# Patient Record
Sex: MALE | Race: WHITE | HISPANIC OR LATINO
[De-identification: names, ages, dates, MRNs, and addresses within clinical notes are randomized per-mention and may not be internally consistent; named-entity substitution may affect disease eponyms.]

---

## 2017-10-31 ENCOUNTER — APPOINTMENT (OUTPATIENT)
Dept: NEUROLOGY | Facility: CLINIC | Age: 72
End: 2017-10-31
Payer: MEDICARE

## 2017-10-31 VITALS — HEART RATE: 56 BPM | DIASTOLIC BLOOD PRESSURE: 80 MMHG | SYSTOLIC BLOOD PRESSURE: 126 MMHG | OXYGEN SATURATION: 96 %

## 2017-10-31 PROCEDURE — 99213 OFFICE O/P EST LOW 20 MIN: CPT

## 2018-10-31 ENCOUNTER — APPOINTMENT (OUTPATIENT)
Dept: NEUROLOGY | Facility: CLINIC | Age: 73
End: 2018-10-31

## 2019-01-29 ENCOUNTER — APPOINTMENT (OUTPATIENT)
Dept: NEUROLOGY | Facility: CLINIC | Age: 74
End: 2019-01-29

## 2019-11-20 ENCOUNTER — APPOINTMENT (OUTPATIENT)
Dept: NEUROLOGY | Facility: CLINIC | Age: 74
End: 2019-11-20
Payer: MEDICARE

## 2019-11-20 VITALS
HEIGHT: 69 IN | WEIGHT: 174 LBS | DIASTOLIC BLOOD PRESSURE: 94 MMHG | OXYGEN SATURATION: 93 % | BODY MASS INDEX: 25.77 KG/M2 | SYSTOLIC BLOOD PRESSURE: 144 MMHG | HEART RATE: 84 BPM

## 2019-11-20 DIAGNOSIS — H49.01 THIRD [OCULOMOTOR] NERVE PALSY, RIGHT EYE: ICD-10-CM

## 2019-11-20 PROCEDURE — 99214 OFFICE O/P EST MOD 30 MIN: CPT

## 2019-11-20 RX ORDER — ATORVASTATIN CALCIUM 10 MG/1
10 TABLET, FILM COATED ORAL
Refills: 0 | Status: ACTIVE | COMMUNITY

## 2019-11-20 NOTE — HISTORY OF PRESENT ILLNESS
[FreeTextEntry1] : 74 year-old male presents for follow up for midbrain Stroke.  He had questions regarding transient visual symptoms and their connection to stroke.  He hasn't had any double vision for at least one year.  Even then, it was very transient upon wakening and didn't last more than a few seconds.  It only remained on the day that he was admitted for the stroke.He follows with Behavioral Optometrist with improving vision, more in his left than right eyes.  No weakness or numbness.  No speech deficits.  [facility].\par \par He's been compliant with his Aspirin.\par He was started on Atorvastatin 10mg for reportedly slightly high cholesterol.  He'll be having repeat lab work in a few weeks at his PMD. \par He was told that his blood pressure was a little high\par \par Exercises regularly.

## 2019-11-20 NOTE — ASSESSMENT
[FreeTextEntry1] : 74 year-old male presents for follow up for midbrain Stroke.  He has no acute complaints but had many conceptual questions about his stroke that I answers.  His neurological exam remains intact with full visual eye movements.     \par \par Plan for sequalae post CVA -  \par 1)  Secondary Stroke prevention - \par a) Continue Aspirin 81mg for antiplatelet\par b) Continue Atorvastatin 10mg for statin.  Would appreciate if his PMD faxes over results when he has repeat lipid profile in a few months.  LDL goal is less than 100.\par \par 2) Risk factor management - his stroke was more related to small vessel disease.\par a) Unclear if he has HTN - deferred to his PMD but I think it would be beneficial to measure his blood pressure at home with further discussion depending on the results\par b) Hyperlipidemia - see above.\par \par 3) Continue exercise as part of healthy lifestyle.\par \par Thank you for allowing me to participate in the care of your patient.  If you have any questions, please feel free to call me at [phone].

## 2019-11-20 NOTE — CONSULT LETTER
[Dear  ___] : Dear  [unfilled], [Courtesy Letter:] : I had the pleasure of seeing your patient, [unfilled], in my office today. [FreeTextEntry2] : Darin Clarke MD\par [facility]\par [address] [place] [address], [address]\par [place] [phone]

## 2019-11-20 NOTE — PHYSICAL EXAM
[FreeTextEntry1] : General: sitting on exam table comfortably, NAD\par Eyes: anicteric sclera\par CV RRR\par Extremities: 2+ radial pulses bilaterally, FROMx4\par \par Neurological exam:\par Mental status: AA&O x 3, fluent, no dysarthria, follows commands, able give full history, memory intact, attention intact, fund of knowledge appropriate\par Cranial nerves: EOMI, no ptosis, PERRL+, VFF, no facial droop, palatal elevation intact, SCM/shoulder shrug 5/5 bilaterally, tongue midline\par Motor: No pronator drift, 5/5 x4, normal bulk and tone\par Sensory: Intact light touch bilaterally\par Cerebellar: FNF and HKS intact bilaterally\par Gait: steady

## 2020-10-22 ENCOUNTER — APPOINTMENT (OUTPATIENT)
Dept: NEUROLOGY | Facility: CLINIC | Age: 75
End: 2020-10-22
Payer: MEDICARE

## 2020-10-22 VITALS
OXYGEN SATURATION: 97 % | HEART RATE: 80 BPM | TEMPERATURE: 97.9 F | HEIGHT: 69 IN | WEIGHT: 190 LBS | BODY MASS INDEX: 28.14 KG/M2 | DIASTOLIC BLOOD PRESSURE: 94 MMHG | SYSTOLIC BLOOD PRESSURE: 160 MMHG

## 2020-10-22 DIAGNOSIS — E78.49 OTHER HYPERLIPIDEMIA: ICD-10-CM

## 2020-10-22 DIAGNOSIS — I69.30 UNSPECIFIED SEQUELAE OF CEREBRAL INFARCTION: ICD-10-CM

## 2020-10-22 PROCEDURE — 99213 OFFICE O/P EST LOW 20 MIN: CPT

## 2020-10-22 PROCEDURE — 99072 ADDL SUPL MATRL&STAF TM PHE: CPT

## 2020-10-22 NOTE — ASSESSMENT
[FreeTextEntry1] : 75 year-old male presents for follow up for midbrain Stroke in 2015 that presented with double vision.  He has no acute complaints but had many conceptual questions about his stroke that I answered.  His neurological exam remains intact.  His vision is complicated now by his cataracts.   \par \par Plan for sequalae post CVA -  \par 1) Secondary Stroke prevention - \par a) Continue Aspirin 81mg for antiplatelet\par b) Continue Atorvastatin 10mg for statin.  LDL goal is less than 100.\par \par 2) Risk factor management - his stroke was more related to small vessel disease.  Deferred management to his PMD.\par a) HTN - elevated today but he is more agitated about the upcoming elections, etc.  Deferred decisions about medications to his PMD.  Discussed the importance of good control  in terms of stroke prevention.\par b) Hyperlipidemia - see above.\par \par 3) Encouraged exercise as part of healthy lifestyle.\par \par Thank you for allowing me to participate in the care of your patient.  If you have any questions, please feel free to call me at [phone].

## 2020-10-22 NOTE — PHYSICAL EXAM
[FreeTextEntry1] : General: sitting on exam table comfortably, NAD\par Eyes: anicteric sclera\par CV RRR\par Extremities: 2+ radial pulses bilaterally, FROMx4\par \par Neurological exam:\par Mental status: AA&O x 3, fluent, no dysarthria, follows commands, able give full history, memory intact, attention intact, fund of knowledge appropriate\par Cranial nerves: EOMI, no ptosis, PERRL+, VFF except has some trouble with inferior temporal quadrant of left eye, no facial droop, SCM/shoulder shrug 5/5 bilaterally, tongue midline\par Motor: No pronator drift, strength 5/5 x4, normal bulk and tone\par Sensory: Intact light touch bilaterally.  Negative Romberg\par Cerebellar: FNF and HKS intact bilaterally\par Gait: steady, able tandem

## 2020-10-22 NOTE — HISTORY OF PRESENT ILLNESS
[FreeTextEntry1] : 75 year-old male presents for yearly follow up for midbrain Stroke.  No acute symptoms in past year.\par \par He's been compliant with his Aspirin and  Atorvastatin 10mg. \par HTN - He's been agitated about elections, etc for past few weeks so thinks that his blood pressure has been high.  He has machine at home.  No headaches.  \par \par He's not exercising as much since [facility] is closed.  He thinks that he's generally weaker than before.

## 2020-10-22 NOTE — CONSULT LETTER
[Dear  ___] : Dear  [unfilled], [Courtesy Letter:] : I had the pleasure of seeing your patient, [unfilled], in my office today. [FreeTextEntry2] : Darin Clarke MD\par [facility]\par [address] [place] [address], [address]\par [place] [phone]

## 2022-05-21 ENCOUNTER — INPATIENT (INPATIENT)
Facility: HOSPITAL | Age: 77
LOS: 1 days | Discharge: HOME | DRG: 65 | End: 2022-05-23
Attending: PSYCHIATRY & NEUROLOGY | Admitting: PSYCHIATRY & NEUROLOGY
Payer: MEDICARE

## 2022-05-21 VITALS
HEIGHT: 69 IN | TEMPERATURE: 97 F | OXYGEN SATURATION: 98 % | DIASTOLIC BLOOD PRESSURE: 84 MMHG | SYSTOLIC BLOOD PRESSURE: 149 MMHG | RESPIRATION RATE: 18 BRPM | HEART RATE: 65 BPM | WEIGHT: 179.9 LBS

## 2022-05-21 LAB
ALBUMIN SERPL ELPH-MCNC: 4.6 G/DL — SIGNIFICANT CHANGE UP (ref 3.4–5)
ALP SERPL-CCNC: 82 U/L — SIGNIFICANT CHANGE UP (ref 40–120)
ALT FLD-CCNC: 31 U/L — SIGNIFICANT CHANGE UP (ref 12–42)
ANION GAP SERPL CALC-SCNC: 9 MMOL/L — SIGNIFICANT CHANGE UP (ref 9–16)
APPEARANCE UR: CLEAR — SIGNIFICANT CHANGE UP
AST SERPL-CCNC: 25 U/L — SIGNIFICANT CHANGE UP (ref 15–37)
BASOPHILS # BLD AUTO: 0.05 K/UL — SIGNIFICANT CHANGE UP (ref 0–0.2)
BASOPHILS NFR BLD AUTO: 0.4 % — SIGNIFICANT CHANGE UP (ref 0–2)
BILIRUB SERPL-MCNC: 0.4 MG/DL — SIGNIFICANT CHANGE UP (ref 0.2–1.2)
BILIRUB UR-MCNC: NEGATIVE — SIGNIFICANT CHANGE UP
BUN SERPL-MCNC: 15 MG/DL — SIGNIFICANT CHANGE UP (ref 7–23)
CALCIUM SERPL-MCNC: 9.5 MG/DL — SIGNIFICANT CHANGE UP (ref 8.5–10.5)
CHLORIDE SERPL-SCNC: 104 MMOL/L — SIGNIFICANT CHANGE UP (ref 96–108)
CO2 SERPL-SCNC: 29 MMOL/L — SIGNIFICANT CHANGE UP (ref 22–31)
COLOR SPEC: YELLOW — SIGNIFICANT CHANGE UP
CREAT SERPL-MCNC: 1.04 MG/DL — SIGNIFICANT CHANGE UP (ref 0.5–1.3)
DIFF PNL FLD: NEGATIVE — SIGNIFICANT CHANGE UP
EGFR: 75 ML/MIN/1.73M2 — SIGNIFICANT CHANGE UP
EOSINOPHIL # BLD AUTO: 0.11 K/UL — SIGNIFICANT CHANGE UP (ref 0–0.5)
EOSINOPHIL NFR BLD AUTO: 0.8 % — SIGNIFICANT CHANGE UP (ref 0–6)
EPI CELLS # UR: SIGNIFICANT CHANGE UP /HPF (ref 0–5)
GLUCOSE SERPL-MCNC: 158 MG/DL — HIGH (ref 70–99)
GLUCOSE UR QL: NEGATIVE — SIGNIFICANT CHANGE UP
HCT VFR BLD CALC: 41.8 % — SIGNIFICANT CHANGE UP (ref 39–50)
HGB BLD-MCNC: 14 G/DL — SIGNIFICANT CHANGE UP (ref 13–17)
IMM GRANULOCYTES NFR BLD AUTO: 0.3 % — SIGNIFICANT CHANGE UP (ref 0–1.5)
KETONES UR-MCNC: NEGATIVE — SIGNIFICANT CHANGE UP
LEUKOCYTE ESTERASE UR-ACNC: NEGATIVE — SIGNIFICANT CHANGE UP
LYMPHOCYTES # BLD AUTO: 1.85 K/UL — SIGNIFICANT CHANGE UP (ref 1–3.3)
LYMPHOCYTES # BLD AUTO: 14 % — SIGNIFICANT CHANGE UP (ref 13–44)
MAGNESIUM SERPL-MCNC: 2 MG/DL — SIGNIFICANT CHANGE UP (ref 1.6–2.6)
MCHC RBC-ENTMCNC: 29.7 PG — SIGNIFICANT CHANGE UP (ref 27–34)
MCHC RBC-ENTMCNC: 33.5 GM/DL — SIGNIFICANT CHANGE UP (ref 32–36)
MCV RBC AUTO: 88.6 FL — SIGNIFICANT CHANGE UP (ref 80–100)
MONOCYTES # BLD AUTO: 0.77 K/UL — SIGNIFICANT CHANGE UP (ref 0–0.9)
MONOCYTES NFR BLD AUTO: 5.8 % — SIGNIFICANT CHANGE UP (ref 2–14)
NEUTROPHILS # BLD AUTO: 10.35 K/UL — HIGH (ref 1.8–7.4)
NEUTROPHILS NFR BLD AUTO: 78.7 % — HIGH (ref 43–77)
NITRITE UR-MCNC: NEGATIVE — SIGNIFICANT CHANGE UP
NRBC # BLD: 0 /100 WBCS — SIGNIFICANT CHANGE UP (ref 0–0)
PH UR: 8.5 — HIGH (ref 5–8)
PLATELET # BLD AUTO: 232 K/UL — SIGNIFICANT CHANGE UP (ref 150–400)
POTASSIUM SERPL-MCNC: 3.6 MMOL/L — SIGNIFICANT CHANGE UP (ref 3.5–5.3)
POTASSIUM SERPL-SCNC: 3.6 MMOL/L — SIGNIFICANT CHANGE UP (ref 3.5–5.3)
PROT SERPL-MCNC: 7.6 G/DL — SIGNIFICANT CHANGE UP (ref 6.4–8.2)
PROT UR-MCNC: ABNORMAL MG/DL
RBC # BLD: 4.72 M/UL — SIGNIFICANT CHANGE UP (ref 4.2–5.8)
RBC # FLD: 13.2 % — SIGNIFICANT CHANGE UP (ref 10.3–14.5)
RBC CASTS # UR COMP ASSIST: < 5 /HPF — SIGNIFICANT CHANGE UP
SARS-COV-2 RNA SPEC QL NAA+PROBE: SIGNIFICANT CHANGE UP
SODIUM SERPL-SCNC: 142 MMOL/L — SIGNIFICANT CHANGE UP (ref 132–145)
SP GR SPEC: 1.01 — SIGNIFICANT CHANGE UP (ref 1–1.03)
UROBILINOGEN FLD QL: 0.2 E.U./DL — SIGNIFICANT CHANGE UP
WBC # BLD: 13.17 K/UL — HIGH (ref 3.8–10.5)
WBC # FLD AUTO: 13.17 K/UL — HIGH (ref 3.8–10.5)
WBC UR QL: < 5 /HPF — SIGNIFICANT CHANGE UP

## 2022-05-21 PROCEDURE — 93010 ELECTROCARDIOGRAM REPORT: CPT

## 2022-05-21 PROCEDURE — 70450 CT HEAD/BRAIN W/O DYE: CPT | Mod: 26

## 2022-05-21 PROCEDURE — 99220: CPT

## 2022-05-21 RX ORDER — ONDANSETRON 8 MG/1
4 TABLET, FILM COATED ORAL ONCE
Refills: 0 | Status: COMPLETED | OUTPATIENT
Start: 2022-05-21 | End: 2022-05-21

## 2022-05-21 RX ORDER — BENZOCAINE AND MENTHOL 5; 1 G/100ML; G/100ML
1 LIQUID ORAL
Refills: 0 | Status: DISCONTINUED | OUTPATIENT
Start: 2022-05-21 | End: 2022-05-23

## 2022-05-21 RX ORDER — AMLODIPINE BESYLATE 2.5 MG/1
1 TABLET ORAL
Qty: 0 | Refills: 0 | DISCHARGE

## 2022-05-21 RX ORDER — METOCLOPRAMIDE HCL 10 MG
10 TABLET ORAL ONCE
Refills: 0 | Status: COMPLETED | OUTPATIENT
Start: 2022-05-21 | End: 2022-05-21

## 2022-05-21 RX ORDER — ALPRAZOLAM 0.25 MG
1 TABLET ORAL
Qty: 0 | Refills: 0 | DISCHARGE

## 2022-05-21 RX ORDER — MECLIZINE HCL 12.5 MG
50 TABLET ORAL ONCE
Refills: 0 | Status: COMPLETED | OUTPATIENT
Start: 2022-05-21 | End: 2022-05-21

## 2022-05-21 RX ORDER — ONDANSETRON 8 MG/1
4 TABLET, FILM COATED ORAL ONCE
Refills: 0 | Status: DISCONTINUED | OUTPATIENT
Start: 2022-05-21 | End: 2022-05-21

## 2022-05-21 RX ORDER — MECLIZINE HCL 12.5 MG
25 TABLET ORAL THREE TIMES A DAY
Refills: 0 | Status: DISCONTINUED | OUTPATIENT
Start: 2022-05-21 | End: 2022-05-23

## 2022-05-21 RX ORDER — ATORVASTATIN CALCIUM 80 MG/1
40 TABLET, FILM COATED ORAL AT BEDTIME
Refills: 0 | Status: DISCONTINUED | OUTPATIENT
Start: 2022-05-21 | End: 2022-05-23

## 2022-05-21 RX ORDER — ATORVASTATIN CALCIUM 80 MG/1
80 TABLET, FILM COATED ORAL AT BEDTIME
Refills: 0 | Status: DISCONTINUED | OUTPATIENT
Start: 2022-05-21 | End: 2022-05-21

## 2022-05-21 RX ORDER — ENOXAPARIN SODIUM 100 MG/ML
40 INJECTION SUBCUTANEOUS EVERY 24 HOURS
Refills: 0 | Status: DISCONTINUED | OUTPATIENT
Start: 2022-05-21 | End: 2022-05-23

## 2022-05-21 RX ORDER — ASPIRIN/CALCIUM CARB/MAGNESIUM 324 MG
81 TABLET ORAL DAILY
Refills: 0 | Status: DISCONTINUED | OUTPATIENT
Start: 2022-05-21 | End: 2022-05-23

## 2022-05-21 RX ORDER — SODIUM CHLORIDE 9 MG/ML
1000 INJECTION INTRAMUSCULAR; INTRAVENOUS; SUBCUTANEOUS ONCE
Refills: 0 | Status: COMPLETED | OUTPATIENT
Start: 2022-05-21 | End: 2022-05-21

## 2022-05-21 RX ADMIN — Medication 104 MILLIGRAM(S): at 21:41

## 2022-05-21 RX ADMIN — BENZOCAINE AND MENTHOL 1 LOZENGE: 5; 1 LIQUID ORAL at 19:23

## 2022-05-21 RX ADMIN — SODIUM CHLORIDE 1000 MILLILITER(S): 9 INJECTION INTRAMUSCULAR; INTRAVENOUS; SUBCUTANEOUS at 09:56

## 2022-05-21 RX ADMIN — Medication 1 MILLIGRAM(S): at 06:29

## 2022-05-21 RX ADMIN — ONDANSETRON 4 MILLIGRAM(S): 8 TABLET, FILM COATED ORAL at 03:17

## 2022-05-21 RX ADMIN — SODIUM CHLORIDE 1000 MILLILITER(S): 9 INJECTION INTRAMUSCULAR; INTRAVENOUS; SUBCUTANEOUS at 03:17

## 2022-05-21 RX ADMIN — ENOXAPARIN SODIUM 40 MILLIGRAM(S): 100 INJECTION SUBCUTANEOUS at 21:42

## 2022-05-21 RX ADMIN — Medication 81 MILLIGRAM(S): at 12:41

## 2022-05-21 RX ADMIN — ONDANSETRON 4 MILLIGRAM(S): 8 TABLET, FILM COATED ORAL at 15:17

## 2022-05-21 RX ADMIN — ONDANSETRON 4 MILLIGRAM(S): 8 TABLET, FILM COATED ORAL at 11:11

## 2022-05-21 RX ADMIN — ATORVASTATIN CALCIUM 40 MILLIGRAM(S): 80 TABLET, FILM COATED ORAL at 21:42

## 2022-05-21 RX ADMIN — Medication 10 MILLIGRAM(S): at 05:07

## 2022-05-21 RX ADMIN — Medication 50 MILLIGRAM(S): at 04:03

## 2022-05-21 NOTE — ED PROVIDER NOTE - OBJECTIVE STATEMENT
77M PMH HTN, ?prior CVA in Dec 2021 (at [facility], no residual deficits) presents to the ED with acute onset room spinning dizziness and nausea that began 2.5 hrs pta. States it's worse with head movement and eye movement. Denies any recent trauma to the head, denies any headache, vision changes, weakness/numbness/tingling in extremities. Denies any chest pain, diaphoresis, sob, palpitations. Denies any recent fevers/chills, uri sxs, cough, abd pain, n/v/d, dark/bloody stools, urinary sxs.     Meds: statin, amlodipine, prn xanax, asa

## 2022-05-21 NOTE — ED CDU PROVIDER DISPOSITION NOTE - CLINICAL COURSE
Severe vertigo, likely peripheral but given severity and minimal improvement from treatment, we will admit to neuro to r/o stroke.

## 2022-05-21 NOTE — ED PROVIDER NOTE - NS ED ATTENDING STATEMENT MOD
This was a shared visit with the [facility]. I reviewed and verified the documentation and independently performed the documented:

## 2022-05-21 NOTE — H&P ADULT - NSHPREVIEWOFSYSTEMS_GEN_ALL_CORE
ROS:   Constitutional: No fever, weight loss or fatigue  Eyes: No eye pain, visual disturbances, or discharge  ENMT:  No difficulty hearing, tinnitus; No sinus or throat pain  Neck: No pain or stiffness  Respiratory: No cough, wheezing, chills or hemoptysis  Cardiovascular: No chest pain, palpitations, shortness of breath, or leg swelling  Gastrointestinal: No abdominal pain. +nausea and dry heaving, no hematemasis; No diarrhea or constipation. Nohematochezia.  Genitourinary: No dysuria, frequency, hematuria or incontinence  Neurological: As per HPI  Skin: No itching, burning, rashes or lesions   Endocrine: No heat or cold intolerance; No hair loss  Musculoskeletal: No joint pain or swelling; No muscle, back or extremity pain  Heme/Lymph: No easy bruising or bleeding gums

## 2022-05-21 NOTE — ED CDU PROVIDER INITIAL DAY NOTE - DETAILS
Pt with severe vertigo that will require serial neuro checks and antiemetic and anti-vertigo medications prn.

## 2022-05-21 NOTE — ED ADULT NURSE REASSESSMENT NOTE - NS ED NURSE REASSESS COMMENT FT1
rec'd patient from RN- Oc- pt resting comfortably in bed with some dizziness - Pt also states nausea improved.

## 2022-05-21 NOTE — ED PROVIDER NOTE - PROGRESS NOTE DETAILS
Jean-Pierre, PGY3: pts dizziness improved Jean-Pierre, PGY3: pts dizziness improved with reglan though still feels like he can't eat. will trial ativan and attempt to see if pt can walk/eat, if not pt tba Jean-Pierre, PGY3: gave ativan, pt able to po fluids, when getting pt up to try to ambulate he's very unsteady and only able to ambulate while holding wall though still swaying and a fall risk, pt has limited help at home, unsafe discharge at this time. Pt amenable to admission. TBA for PT/Neuro/possible MR Jean-Pierre, PGY3: gave ativan, pt able to po fluids, when getting pt up to try to ambulate he's very unsteady and only able to ambulate while holding wall though still swaying and a fall risk, pt has limited help at home, unsafe discharge at this time. Pt amenable to admission. TBA for PT/Neuro/possible MR. Called admitting team, they will call back for admission. Accept sign out from Dr. Proctor.  Pt with severe vertigo and N/V, requiring multiple doses of IV medications.  Plan to admit to medicine, however when rounding on patient, him and his family member would like to trial observation.  Vomiting resolved but still has vertigo when trying to walk to the bathroom.  Pt reports this feels very different from his stroke sxs in the past.  Pt has been admitted to [facility] in past but they would prefer admission to [facility] if required.  Will add on UA to complete work up.

## 2022-05-21 NOTE — ED CDU PROVIDER INITIAL DAY NOTE - MEDICAL DECISION MAKING DETAILS
Pt with severe vertigo.  Clinically seems peripheral.  Will trial observation to evaluate for resolution and to perform serial neuro checks.  If pt does not improve, we will consider admission for recurrent stroke work up.  Pt is currently out of the TPA window.

## 2022-05-21 NOTE — ED ADULT TRIAGE NOTE - CHIEF COMPLAINT QUOTE
Pt brought in by EMS for complaint of dizziness, nausea and vomiting that started around midnight. Pt retching at triage.

## 2022-05-21 NOTE — ED PROVIDER NOTE - ATTENDING APP SHARED VISIT CONTRIBUTION OF CARE
76 yo male with hx HTN prior CVA with no residual deficit, with c/o sudden onset positional dizziness and vomiting which started today. Labs OK CT head with old cerebellar infarct. Vomiting resolved, vertigo slightly improved after zofran reglan meclizine and ativan. Still unsteady gait. Plan to admit to [facility] for neurology workup. Called through [facility] but they said they will call us back. will sign out to AM team pending admission.

## 2022-05-21 NOTE — H&P ADULT - NSHPLABSRESULTS_GEN_ALL_CORE
POCT Blood Glucose.: 138 mg/dL (21 May 2022 03:29)    LABS:                        14.0   13.17 )-----------( 232      ( 21 May 2022 03:19 )             41.8         142  |  104  |  15  ----------------------------<  158<H>  3.6   |  29  |  1.04    Ca    9.5      21 May 2022 03:19  Mg     2.0         TPro  7.6  /  Alb  4.6  /  TBili  0.4  /  DBili  x   /  AST  25  /  ALT  31  /  AlkPhos  82            Urinalysis Basic - ( 21 May 2022 10:57 )    Color: Yellow / Appearance: Clear / S.015 / pH: x  Gluc: x / Ketone: NEGATIVE  / Bili: NEGATIVE / Urobili: 0.2 E.U./dL   Blood: x / Protein: Trace mg/dL / Nitrite: NEGATIVE   Leuk Esterase: NEGATIVE / RBC: < 5 /HPF / WBC < 5 /HPF   Sq Epi: x / Non Sq Epi: 0-5 /HPF / Bacteria: x

## 2022-05-21 NOTE — ED CDU PROVIDER INITIAL DAY NOTE - PHYSICAL EXAMINATION
GENERAL: no acute distress, non-toxic appearing  HEENT: normal conjunctiva, oral mucosa moist  CARDIAC: regular rate and regular rhythm, bp reassuring  PULM: clear to ascultation bilaterally, no incr wob  GI: abdomen nondistended, soft, nontender  : no CVA tenderness, no suprapubic tenderness  NEURO: alert and oriented x 3, normal speech, eomi without nystagmus, stepan bilat, 5/5 strength all extremities, CN's3-12 intact.  Unsteady gait - has to hold onto wall to walk to bathroom.   MSK: no visible deformities, no peripheral edema, calf tenderness/redness/swelling  SKIN: no visible rashes  PSYCH: appropriate mood and affect

## 2022-05-21 NOTE — PATIENT PROFILE ADULT - FALL HARM RISK - HARM RISK INTERVENTIONS
Assistance with ambulation/Assistance OOB with selected safe patient handling equipment/Communicate Risk of Fall with Harm to all staff/Discuss with provider need for PT consult/Monitor gait and stability/Provide patient with walking aids - walker, cane, crutches/Reinforce activity limits and safety measures with patient and family/Sit up slowly, dangle for a short time, stand at bedside before walking/Tailored Fall Risk Interventions/Visual Cue: Yellow wristband and red socks/Bed in lowest position, wheels locked, appropriate side rails in place/Call bell, personal items and telephone in reach/Instruct patient to call for assistance before getting out of bed or chair/Non-slip footwear when patient is out of bed/[place] to call system/Physically safe environment - no spills, clutter or unnecessary equipment/Purposeful Proactive Rounding/Room/bathroom lighting operational, light cord in reach

## 2022-05-21 NOTE — ED ADULT NURSE NOTE - NSIMPLEMENTINTERV_GEN_ALL_ED
Implemented All Universal Safety Interventions:  [place] to call system. Call bell, personal items and telephone within reach. Instruct patient to call for assistance. Room bathroom lighting operational. Non-slip footwear when patient is off stretcher. Physically safe environment: no spills, clutter or unnecessary equipment. Stretcher in lowest position, wheels locked, appropriate side rails in place.

## 2022-05-21 NOTE — ED ADULT TRIAGE NOTE - HEART RATE (BEATS/MIN)
Vital Signs Last 24 Hrs  T(C): 37.8 (31 Jul 2020 20:17), Max: 38.4 (31 Jul 2020 12:32)  T(F): 100.1 (31 Jul 2020 20:17), Max: 101.2 (31 Jul 2020 12:32)  HR: 77 (31 Jul 2020 20:17) (62 - 87)  BP: 159/63 (31 Jul 2020 20:17) (109/52 - 159/63)  RR: 18 (31 Jul 2020 20:17) (18 - 20)  SpO2: 98% (31 Jul 2020 20:17) (95% - 98%)    GENERAL: NAD, lying in bed comfortably  HEAD:  Atraumatic, Normocephalic  EYES: EOMI, PERRLA, conjunctiva and sclera clear  ENT: Moist mucous membranes  NECK: Supple, No JVD  CHEST/LUNG: Clear to auscultation bilaterally; No rales, rhonchi, wheezing, or rubs. Unlabored respirations  HEART: Regular rate and rhythm; No murmurs, rubs, or gallops  ABDOMEN: Bowel sounds present; Soft, Nontender, Nondistended. No hepatomegally  EXTREMITIES:  2+ Peripheral Pulses, brisk capillary refill. No clubbing, cyanosis, or edema  NERVOUS SYSTEM:  Alert & Oriented X3, speech clear. No deficits   MSK: FROM all 4 extremities, full and equal strength  SKIN: No rashes or lesions 67 Vital Signs Last 24 Hrs  T(C): 37.8 (31 Jul 2020 20:17), Max: 38.4 (31 Jul 2020 12:32)  T(F): 100.1 (31 Jul 2020 20:17), Max: 101.2 (31 Jul 2020 12:32)  HR: 77 (31 Jul 2020 20:17) (62 - 87)  BP: 159/63 (31 Jul 2020 20:17) (109/52 - 159/63)  RR: 18 (31 Jul 2020 20:17) (18 - 20)  SpO2: 98% (31 Jul 2020 20:17) (95% - 98%)    head/neck-CN3-12 grossly intact, EOMI no nystagmus, neck supple, moist mucous membranes,   CHEST/LUNG: Clear to auscultation bilaterally; No rales, rhonchi, wheezing, or rubs.  HEART: Regular rate and rhythm; No murmurs, rubs, or gallops  ABDOMEN: Bowel sounds high pitched and tinkling, tender to palpation in all 4 quadrants, rebound tenderness, nondistended  EXTREMITIES:  2+ Peripheral Pulses, No clubbing, cyanosis, or edema  NERVOUS SYSTEM:  Alert & Oriented X3, speech clear. No deficits   MSK: FROM all 4 extremities, full and equal strength  SKIN: No rashes or petechiae

## 2022-05-21 NOTE — ED BEHAVIORAL HEALTH NOTE - BEHAVIORAL HEALTH NOTE
PT is a 77 year old male with a history of hypertension and CVA in Dec 2021 presented to the ED with acute onset room spinning dizziness and nausea.  SW was consulted to the ED by Provider to complete a psychosocial assessment with PT for hospital admissions.  However, due to severity of illness PT was unable to complete the assessment at this time.  Team made aware and SW made available for further assistance.

## 2022-05-21 NOTE — H&P ADULT - NSHPPHYSICALEXAM_GEN_ALL_CORE
Physical exam:  Constitutional: No acute distress, conversant  Eyes: Anicteric sclerae, moist conjunctivae, see below for CNs  Neck: trachea midline, FROM, supple, no thyromegaly or lymphadenopathy  Cardiovascular: Regular rate and rhythm, no murmurs  Pulmonary: Anterior breath sounds clear bilaterally, no crackles or wheezing. No use of accessory muscles  GI: Abdomen soft, non-distended, non-tender  Extremities: Radial and DP pulses +2, no edema    Neurologic:  -Mental status: Awake, alert, oriented to person, age, and month. Speech is fluent with intact naming, repetition, and comprehension, no dysarthria. Recent and remote memory intact. Follows commands. Attention/concentration intact. Fund of knowledge appropriate.  -Cranial nerves:   II: Visual fields are full to confrontation.  III, IV, VI: Extraocular movements are intact without nystagmus. Pupils equally round and reactive to light  V:  Facial sensation V1-V3 equal and intact   VII: Face is symmetric   Motor: Normal bulk and tone. No pronator drift. Strength bilateral upper extremity 5/5, bilateral lower extremities 5/5.  Sensation: Intact to light touch bilaterally. No neglect or extinction on double simultaneous testing.  Coordination: No dysmetria on finger-to-nose and heel-to-shin bilaterally  Reflexes: Downgoing toes bilaterally     NIHSS: 0

## 2022-05-21 NOTE — ED CDU PROVIDER INITIAL DAY NOTE - OBJECTIVE STATEMENT
77M PMH HTN, ?prior CVA in Dec 2021 (at [facility], no residual deficits) presents to the ED with acute onset room spinning dizziness and nausea that began 2.5 hrs pta. States it's worse with head movement and eye movement. Denies any recent trauma to the head, denies any headache, vision changes, weakness/numbness/tingling in extremities. Denies any chest pain, diaphoresis, sob, palpitations. Denies any recent fevers/chills, uri sxs, cough, abd pain, n/v/d, dark/bloody stools, urinary sxs.     Meds: statin, amlodipine, prn xanax, asa

## 2022-05-21 NOTE — ED CDU PROVIDER INITIAL DAY NOTE - PROGRESS NOTE DETAILS
Jean-Pierre, PGY3: gave ativan, pt able to po fluids, when getting pt up to try to ambulate he's very unsteady and only able to ambulate while holding wall though still swaying and a fall risk, pt has limited help at home, unsafe discharge at this time. Pt amenable to admission. TBA for PT/Neuro/possible MR. Called admitting team, they will call back for admission. Neurology consulted. I discussed the case with both Dr. morgan from tele-stroke and Dr. Brown from neuro at [facility].  They agree that pt should be admitted to [facility] for MRI/MRA to r/o recurrent posterior/cerebellar stroke.  Plan discussed with patient and his partner and they give verbal consent for transfer.

## 2022-05-21 NOTE — H&P ADULT - ASSESSMENT
77y Male with PMHx of HTN, right midbrain stroke in 2015 (on aspirin and statin), presented to [facility] for room spinning dizziness, pt still dizzy after meclizine and ativan so transferred to [facility] for further evaluation.     Neuro  #CVA workup - stroke vs peripheral vertigo  - continue aspirin 81mg   - continue atorvastatin 40mg daily  - q4hr stroke neuro checks and vitals  - obtain MRA head without contrast  - Stroke Code HCT Results: no acute infarct  - Stroke education  -meclizine PRN for dizziness    Cards  #HTN  - Goal -180  - hold home blood pressure medication for now  - obtain TTE with bubble pending MRI    #HLD  - high dose statin as above in CVA  - LDL results: pending    Pulm  - call provider if SPO2 < 94%    GI  #Nutrition/Fluids/Electrolytes   - replete K<4 and Mg <2  - Diet: DASH  - IVF:     Infectious Disease  - obtain chest XR  -WBC slightly elevated at 13, continue to trend  -afebrile  - UA neg    Endocrine  #DM  - A1C results: pending    - TSH results: pending    DVT Prophylaxis  - lovenox sq for DVT prophylaxis   - SCDs for DVT prophylaxis     Pt discussed with Dr. Brown     Discussed daily hospital plans and goals with patient and domestic partner at bedside.

## 2022-05-21 NOTE — ED PROVIDER NOTE - IV ALTEPLASE DOOR HIDDEN
How Severe Is Your Skin Lesion?: mild Have Your Skin Lesions Been Treated?: not been treated Is This A New Presentation, Or A Follow-Up?: Skin Lesions show

## 2022-05-21 NOTE — H&P ADULT - HISTORY OF PRESENT ILLNESS
**STROKE CODE CONSULT NOTE**    Last known well time/Time of onset of symptoms: midnight 5/21    HPI: 77y Male with PMHx of HTN, right midbrain stroke in 2015 (on aspirin and statin), presented to [facility] for dizziness. Pt states that he was on the sofa watching TV and when he got up to go to bed, he felt severe room spinning dizziness and felt that he could not walk.     T(C): 36.8 (05-21-22 @ 12:09), Max: 36.8 (05-21-22 @ 12:09)  HR: 78 (05-21-22 @ 12:09) (65 - 82)  BP: 142/77 (05-21-22 @ 12:09) (117/68 - 149/84)  RR: 18 (05-21-22 @ 12:09) (18 - 18)  SpO2: 98% (05-21-22 @ 12:09) (98% - 98%)    **STROKE CODE CONSULT NOTE**    Last known well time/Time of onset of symptoms: midnight 5/21    HPI: 77y Male with PMHx of HTN, right midbrain stroke in 2015 (on aspirin and statin), presented to [facility] for dizziness. Pt states that he was on the sofa watching TV and when he got up to go to bed, he felt severe room spinning dizziness and felt that he could not walk well. He also was nauseous and was dry heaving. Pt went to [facility] to be evaluated. Pt was getting worked up for peripheral vertigo, received meclizine and Ativan, and pt currently states he feels better but not 100%. He is not sure which medication helped him. He says that he walked about 12 feet with the ER attending at [facility] but they still felt it better that he be transferred to [facility].    At [facility], pt still feels slightly dizzy and nauseous but feels much better. He states he also gets intermittent tingling of his right arm but has had that for some time. He said with his previous stroke he had double vision, has never had dizziness like this. Denies weakness, slurred speech, or headache.    T(C): 36.8 (05-21-22 @ 12:09), Max: 36.8 (05-21-22 @ 12:09)  HR: 78 (05-21-22 @ 12:09) (65 - 82)  BP: 142/77 (05-21-22 @ 12:09) (117/68 - 149/84)  RR: 18 (05-21-22 @ 12:09) (18 - 18)  SpO2: 98% (05-21-22 @ 12:09) (98% - 98%)   Last known well time/Time of onset of symptoms: midnight 5/21    HPI: 77y Male with PMHx of HTN, right midbrain stroke in 2015 (on aspirin and statin), presented to [facility] for dizziness. Pt states that he was on the sofa watching TV and when he got up to go to bed, he felt severe room spinning dizziness and felt that he could not walk well. He also was nauseous and was dry heaving. Pt went to [facility] to be evaluated. Pt was getting worked up for peripheral vertigo, received meclizine and Ativan, and pt currently states he feels better but not 100%. He is not sure which medication helped him. He says that he walked about 12 feet with the ER attending at [facility] but they still felt it better that he be transferred to [facility].    At [facility], pt still feels slightly dizzy and nauseous but feels much better. He states he also gets intermittent tingling of his right arm but has had that for some time. He said with his previous stroke he had double vision, has never had dizziness like this. Denies weakness, slurred speech, or headache.    T(C): 36.8 (05-21-22 @ 12:09), Max: 36.8 (05-21-22 @ 12:09)  HR: 78 (05-21-22 @ 12:09) (65 - 82)  BP: 142/77 (05-21-22 @ 12:09) (117/68 - 149/84)  RR: 18 (05-21-22 @ 12:09) (18 - 18)  SpO2: 98% (05-21-22 @ 12:09) (98% - 98%)

## 2022-05-21 NOTE — ED ADULT NURSE NOTE - OBJECTIVE STATEMENT
76 y/o male c/o room spinning, dizziness, nausea and vomiting- pt with h/o htn, and CVA in CVA ( no neuro deficits ) pt with unsteady gait

## 2022-05-21 NOTE — ED PROVIDER NOTE - PHYSICAL EXAMINATION
GENERAL: no acute distress, non-toxic appearing  HEENT: normal conjunctiva, oral mucosa moist  CARDIAC: regular rate and regular rhythm, bp reassuring  PULM: clear to ascultation bilaterally, no incr wob  GI: abdomen nondistended, soft, nontender  : no CVA tenderness, no suprapubic tenderness  NEURO: alert and oriented x 3, normal speech, eomi without nystagmus, stepan bilat, 5/5 strength all extremities, CN's3-12 intact  MSK: no visible deformities, no peripheral edema, calf tenderness/redness/swelling  SKIN: no visible rashes  PSYCH: appropriate mood and affect

## 2022-05-21 NOTE — ED PROVIDER NOTE - CLINICAL SUMMARY MEDICAL DECISION MAKING FREE TEXT BOX
Jean-Pierre, PGY3: likely perph vertigo, low suspicion central in etiology. Will do labs, fluids, ekg, CTH, meds, PO chall/trial ambulation. Reassess.

## 2022-05-22 LAB
A1C WITH ESTIMATED AVERAGE GLUCOSE RESULT: 5.5 % — SIGNIFICANT CHANGE UP (ref 4–5.6)
ANION GAP SERPL CALC-SCNC: 11 MMOL/L — SIGNIFICANT CHANGE UP (ref 5–17)
BASOPHILS # BLD AUTO: 0.03 K/UL — SIGNIFICANT CHANGE UP (ref 0–0.2)
BASOPHILS NFR BLD AUTO: 0.3 % — SIGNIFICANT CHANGE UP (ref 0–2)
BUN SERPL-MCNC: 15 MG/DL — SIGNIFICANT CHANGE UP (ref 7–23)
CALCIUM SERPL-MCNC: 9.3 MG/DL — SIGNIFICANT CHANGE UP (ref 8.4–10.5)
CHLORIDE SERPL-SCNC: 105 MMOL/L — SIGNIFICANT CHANGE UP (ref 96–108)
CHOLEST SERPL-MCNC: 133 MG/DL — SIGNIFICANT CHANGE UP
CO2 SERPL-SCNC: 25 MMOL/L — SIGNIFICANT CHANGE UP (ref 22–31)
CREAT SERPL-MCNC: 1.08 MG/DL — SIGNIFICANT CHANGE UP (ref 0.5–1.3)
EGFR: 72 ML/MIN/1.73M2 — SIGNIFICANT CHANGE UP
EOSINOPHIL # BLD AUTO: 0.12 K/UL — SIGNIFICANT CHANGE UP (ref 0–0.5)
EOSINOPHIL NFR BLD AUTO: 1.1 % — SIGNIFICANT CHANGE UP (ref 0–6)
ESTIMATED AVERAGE GLUCOSE: 111 MG/DL — SIGNIFICANT CHANGE UP (ref 68–114)
GLUCOSE SERPL-MCNC: 111 MG/DL — HIGH (ref 70–99)
HCT VFR BLD CALC: 42.3 % — SIGNIFICANT CHANGE UP (ref 39–50)
HCV AB S/CO SERPL IA: 0.04 S/CO — SIGNIFICANT CHANGE UP
HCV AB SERPL-IMP: SIGNIFICANT CHANGE UP
HDLC SERPL-MCNC: 62 MG/DL — SIGNIFICANT CHANGE UP
HGB BLD-MCNC: 13.8 G/DL — SIGNIFICANT CHANGE UP (ref 13–17)
IMM GRANULOCYTES NFR BLD AUTO: 0.3 % — SIGNIFICANT CHANGE UP (ref 0–1.5)
LIPID PNL WITH DIRECT LDL SERPL: 52 MG/DL — SIGNIFICANT CHANGE UP
LYMPHOCYTES # BLD AUTO: 1.94 K/UL — SIGNIFICANT CHANGE UP (ref 1–3.3)
LYMPHOCYTES # BLD AUTO: 17.8 % — SIGNIFICANT CHANGE UP (ref 13–44)
MAGNESIUM SERPL-MCNC: 1.9 MG/DL — SIGNIFICANT CHANGE UP (ref 1.6–2.6)
MCHC RBC-ENTMCNC: 29.1 PG — SIGNIFICANT CHANGE UP (ref 27–34)
MCHC RBC-ENTMCNC: 32.6 GM/DL — SIGNIFICANT CHANGE UP (ref 32–36)
MCV RBC AUTO: 89.2 FL — SIGNIFICANT CHANGE UP (ref 80–100)
MONOCYTES # BLD AUTO: 0.79 K/UL — SIGNIFICANT CHANGE UP (ref 0–0.9)
MONOCYTES NFR BLD AUTO: 7.2 % — SIGNIFICANT CHANGE UP (ref 2–14)
NEUTROPHILS # BLD AUTO: 8.01 K/UL — HIGH (ref 1.8–7.4)
NEUTROPHILS NFR BLD AUTO: 73.3 % — SIGNIFICANT CHANGE UP (ref 43–77)
NON HDL CHOLESTEROL: 71 MG/DL — SIGNIFICANT CHANGE UP
NRBC # BLD: 0 /100 WBCS — SIGNIFICANT CHANGE UP (ref 0–0)
PHOSPHATE SERPL-MCNC: 2.5 MG/DL — SIGNIFICANT CHANGE UP (ref 2.5–4.5)
PLATELET # BLD AUTO: 248 K/UL — SIGNIFICANT CHANGE UP (ref 150–400)
POTASSIUM SERPL-MCNC: 4.3 MMOL/L — SIGNIFICANT CHANGE UP (ref 3.5–5.3)
POTASSIUM SERPL-SCNC: 4.3 MMOL/L — SIGNIFICANT CHANGE UP (ref 3.5–5.3)
RBC # BLD: 4.74 M/UL — SIGNIFICANT CHANGE UP (ref 4.2–5.8)
RBC # FLD: 13.3 % — SIGNIFICANT CHANGE UP (ref 10.3–14.5)
SODIUM SERPL-SCNC: 141 MMOL/L — SIGNIFICANT CHANGE UP (ref 135–145)
TRIGL SERPL-MCNC: 97 MG/DL — SIGNIFICANT CHANGE UP
TSH SERPL-MCNC: 1.09 UIU/ML — SIGNIFICANT CHANGE UP (ref 0.27–4.2)
WBC # BLD: 10.92 K/UL — HIGH (ref 3.8–10.5)
WBC # FLD AUTO: 10.92 K/UL — HIGH (ref 3.8–10.5)

## 2022-05-22 PROCEDURE — 70544 MR ANGIOGRAPHY HEAD W/O DYE: CPT | Mod: 26

## 2022-05-22 PROCEDURE — 99223 1ST HOSP IP/OBS HIGH 75: CPT

## 2022-05-22 PROCEDURE — 70547 MR ANGIOGRAPHY NECK W/O DYE: CPT | Mod: 26

## 2022-05-22 PROCEDURE — 71045 X-RAY EXAM CHEST 1 VIEW: CPT | Mod: 26

## 2022-05-22 PROCEDURE — 99233 SBSQ HOSP IP/OBS HIGH 50: CPT

## 2022-05-22 RX ADMIN — Medication 81 MILLIGRAM(S): at 09:51

## 2022-05-22 RX ADMIN — ENOXAPARIN SODIUM 40 MILLIGRAM(S): 100 INJECTION SUBCUTANEOUS at 21:16

## 2022-05-22 RX ADMIN — ATORVASTATIN CALCIUM 40 MILLIGRAM(S): 80 TABLET, FILM COATED ORAL at 21:16

## 2022-05-22 NOTE — PROGRESS NOTE ADULT - NS ATTEND OPT1 GEN_ALL_CORE
Referred To Asc For Closure Text (Leave Blank If You Do Not Want): After obtaining clear surgical margins the patient was sent to an [facility] for surgical repair. The patient understands they will receive post-surgical care and follow-up from the [facility] physician. I attest my time as attending is greater than 50% of the total combined time spent on qualifying patient care activities by the PA/NP and attending.

## 2022-05-22 NOTE — PROGRESS NOTE ADULT - ASSESSMENT
77y Male with PMHx of HTN, right midbrain stroke in 2015 (on aspirin and statin), presented to [facility] for room spinning dizziness, pt still dizzy after meclizine and ativan so transferred to [facility] for further evaluation. MRI done showing left cerebellar stroke.     Neuro  #CVA workup - stroke vs peripheral vertigo  - continue aspirin 81mg - order for accumetrics 5/23 AM  -Hold plavix for now given size of stroke  - continue atorvastatin 40mg daily  - q4hr stroke neuro checks and vitals  - MRA head/neck showing left cerebellar stroke, f/u official read and vessel imaging read  -consider NE and loop recorder  - Stroke Code HCT Results: no acute infarct  - Stroke education  -meclizine PRN for dizziness    Cards  #HTN  - Goal -180  - hold home blood pressure medication for now  - obtain NE as above     #HLD  - high dose statin as above in CVA  - LDL results: 52    Pulm  - call provider if SPO2 < 94%    GI  #Nutrition/Fluids/Electrolytes   - replete K<4 and Mg <2  - Diet: DASH  - IVF:     Infectious Disease  - obtain chest XR  -WBC slightly elevated at 13 on admission, downtrended to 10. Continue to trend.  -afebrile  - UA neg    Endocrine  #DM  - A1C results: 5.5    - TSH results: 1.090    DVT Prophylaxis  - lovenox sq for DVT prophylaxis   - SCDs for DVT prophylaxis     Pt discussed with Dr. Brown     Discussed daily hospital plans and goals with patient and domestic partner at bedside.    77y Male with PMHx of HTN, right midbrain stroke in 2015 (on aspirin and statin), presented to [facility] for room spinning dizziness, pt still dizzy after meclizine and ativan so transferred to [facility] for further evaluation. MRI done showing b/l cerebellar stroke.     Neuro  #CVA workup - stroke vs peripheral vertigo  - continue aspirin 81mg - order for accumetrics 5/23 AM  -Hold plavix for now given size of stroke  - continue atorvastatin 40mg daily  - q4hr stroke neuro checks and vitals  - MRA head/neck showing b/l but mostly left cerebellar stroke, f/u official read and vessel imaging read  -consider NE and loop recorder  - Stroke Code HCT Results: no acute infarct  - Stroke education  -meclizine PRN for dizziness    Cards  #HTN  - Goal -180  - hold home blood pressure medication for now  - obtain NE as above     #HLD  - high dose statin as above in CVA  - LDL results: 52    Pulm  - call provider if SPO2 < 94%    GI  #Nutrition/Fluids/Electrolytes   - replete K<4 and Mg <2  - Diet: DASH  - IVF:     Infectious Disease  - obtain chest XR  -WBC slightly elevated at 13 on admission, downtrended to 10. Continue to trend.  -afebrile  - UA neg    Endocrine  #DM  - A1C results: 5.5    - TSH results: 1.090    DVT Prophylaxis  - lovenox sq for DVT prophylaxis   - SCDs for DVT prophylaxis     Pt discussed with Dr. Brown     Discussed daily hospital plans and goals with patient and domestic partner at bedside.

## 2022-05-22 NOTE — OCCUPATIONAL THERAPY INITIAL EVALUATION ADULT - GENERAL OBSERVATIONS, REHAB EVAL
Patient received sitting at EOB dressed in street clothes, speaking with his friend at bedside, +tele, +RA, +heplock, in NAD and agreeable to OT session

## 2022-05-22 NOTE — OCCUPATIONAL THERAPY INITIAL EVALUATION ADULT - GROSSLY INTACT, SENSORY
patient reports mild numbness that comes and goes in R elbow region, however at this time of this assessment denies numbness/tingling/decreased sensation

## 2022-05-22 NOTE — OCCUPATIONAL THERAPY INITIAL EVALUATION ADULT - ADDITIONAL COMMENTS
Patient reports living with his domestic partner of whom he is NOT in a romantic relationship with. Patient and his partner assist each other as needed and take care of each other properties. Patient reports having a minor stroke in dec of 2021, of which his double vision resolved soon after, reports no other deficits from this CVA. Patient reports being very active in a singing group and also completing group senior classes that assist his balance better than outpatient physical therapy did.   Prior to this admission patient was independent with all ADL and IADL.

## 2022-05-22 NOTE — OCCUPATIONAL THERAPY INITIAL EVALUATION ADULT - MODALITIES TREATMENT COMMENTS
Cranial Nerves II - XII: II: PERRLA; visual fields are full to confrontation III, IV, VI: EOMI, no nystagmus appreciated; V: facial sensation intact to light touch V1-V3 b/l; VII: no ptosis, MINOR LEFT SIDED facial droop, symmetric eyebrow raise and closure; VIII: hearing intact to finger rub b/l  IX, X: uvula is midline, soft palate rises symmetrically; XI: head turning and shoulder shrug MINOR difference noted with decrease in L shoulder shrug ; XII: tongue protrusion midline. Completed functional mobility in hallway without AD and no LOB noted. Patient feels his balance is slightly off, this therapist educated that physical therapy to evaluate the patient.

## 2022-05-22 NOTE — OCCUPATIONAL THERAPY INITIAL EVALUATION ADULT - PERTINENT HX OF CURRENT PROBLEM, REHAB EVAL
77y Male with PMHx of HTN, right midbrain stroke in 2015 (on aspirin and statin), presented to [facility] for room spinning dizziness, pt still dizzy after meclizine and ativan so transferred to [facility] for further evaluation. MRI done showing b/l cerebellar stroke.

## 2022-05-22 NOTE — PROGRESS NOTE ADULT - NS ATTEND AMEND GEN_ALL_CORE FT
The patient is a 77-year-old male with a history of hypertension, right midbrain stroke in 2015, and more recent cerebellar strokes of unclear etiology in late 2021 who was admitted after presenting with persistent vertigo and gait instability despite treatment for peripheral vertigo.  He was not a tPA or thrombectomy candidate.  MRI confirms a left cerebellum stroke as well as smaller foci of restricted diffusion in the right cerebellum.  Concern is for cardioembolic etiology as MRA head/neck was negative for significant large vessel disease.  Plan for NE and ILR placement.  For now, we will continue home aspirin given the risk for hemorrhage but will check Accumetrics.  Continue home statin.  Will monitor closely for swelling.

## 2022-05-22 NOTE — OCCUPATIONAL THERAPY INITIAL EVALUATION ADULT - REFERRAL TO ANOTHER SERVICE NEEDED, OT EVAL
Patient states his speech fluency is off compared to his baseline, minor hesitances noted by this therapist during evaluation/speech language pathology Patient states his speech fluency is off compared to his baseline, minor hesitances noted by this therapist during evaluation. Stroke ACP informed of request for consult/speech language pathology

## 2022-05-23 ENCOUNTER — TRANSCRIPTION ENCOUNTER (OUTPATIENT)
Age: 77
End: 2022-05-23

## 2022-05-23 VITALS
SYSTOLIC BLOOD PRESSURE: 135 MMHG | OXYGEN SATURATION: 95 % | HEART RATE: 87 BPM | DIASTOLIC BLOOD PRESSURE: 79 MMHG | RESPIRATION RATE: 16 BRPM

## 2022-05-23 DIAGNOSIS — I10 ESSENTIAL (PRIMARY) HYPERTENSION: ICD-10-CM

## 2022-05-23 DIAGNOSIS — I63.9 CEREBRAL INFARCTION, UNSPECIFIED: ICD-10-CM

## 2022-05-23 DIAGNOSIS — Q21.1 ATRIAL SEPTAL DEFECT: ICD-10-CM

## 2022-05-23 LAB
ANION GAP SERPL CALC-SCNC: 9 MMOL/L — SIGNIFICANT CHANGE UP (ref 5–17)
APTT BLD: 30.3 SEC — SIGNIFICANT CHANGE UP (ref 27.5–35.5)
BUN SERPL-MCNC: 15 MG/DL — SIGNIFICANT CHANGE UP (ref 7–23)
CALCIUM SERPL-MCNC: 9.2 MG/DL — SIGNIFICANT CHANGE UP (ref 8.4–10.5)
CHLORIDE SERPL-SCNC: 106 MMOL/L — SIGNIFICANT CHANGE UP (ref 96–108)
CO2 SERPL-SCNC: 25 MMOL/L — SIGNIFICANT CHANGE UP (ref 22–31)
CREAT SERPL-MCNC: 1.01 MG/DL — SIGNIFICANT CHANGE UP (ref 0.5–1.3)
EGFR: 78 ML/MIN/1.73M2 — SIGNIFICANT CHANGE UP
GLUCOSE SERPL-MCNC: 113 MG/DL — HIGH (ref 70–99)
HCT VFR BLD CALC: 40.7 % — SIGNIFICANT CHANGE UP (ref 39–50)
HGB BLD-MCNC: 13.9 G/DL — SIGNIFICANT CHANGE UP (ref 13–17)
INR BLD: 0.97 — SIGNIFICANT CHANGE UP (ref 0.88–1.16)
MAGNESIUM SERPL-MCNC: 1.9 MG/DL — SIGNIFICANT CHANGE UP (ref 1.6–2.6)
MCHC RBC-ENTMCNC: 30.8 PG — SIGNIFICANT CHANGE UP (ref 27–34)
MCHC RBC-ENTMCNC: 34.2 GM/DL — SIGNIFICANT CHANGE UP (ref 32–36)
MCV RBC AUTO: 90 FL — SIGNIFICANT CHANGE UP (ref 80–100)
NRBC # BLD: 0 /100 WBCS — SIGNIFICANT CHANGE UP (ref 0–0)
PHOSPHATE SERPL-MCNC: 2.4 MG/DL — LOW (ref 2.5–4.5)
PLATELET # BLD AUTO: 225 K/UL — SIGNIFICANT CHANGE UP (ref 150–400)
PLATELET RESPONSE ASPIRIN RESULT: 564 ARU — SIGNIFICANT CHANGE UP (ref 350–700)
POTASSIUM SERPL-MCNC: 5 MMOL/L — SIGNIFICANT CHANGE UP (ref 3.5–5.3)
POTASSIUM SERPL-SCNC: 5 MMOL/L — SIGNIFICANT CHANGE UP (ref 3.5–5.3)
PROTHROM AB SERPL-ACNC: 11.5 SEC — SIGNIFICANT CHANGE UP (ref 10.5–13.4)
RBC # BLD: 4.52 M/UL — SIGNIFICANT CHANGE UP (ref 4.2–5.8)
RBC # FLD: 13.2 % — SIGNIFICANT CHANGE UP (ref 10.3–14.5)
SODIUM SERPL-SCNC: 140 MMOL/L — SIGNIFICANT CHANGE UP (ref 135–145)
WBC # BLD: 9.18 K/UL — SIGNIFICANT CHANGE UP (ref 3.8–10.5)
WBC # FLD AUTO: 9.18 K/UL — SIGNIFICANT CHANGE UP (ref 3.8–10.5)

## 2022-05-23 PROCEDURE — 80053 COMPREHEN METABOLIC PANEL: CPT

## 2022-05-23 PROCEDURE — 97161 PT EVAL LOW COMPLEX 20 MIN: CPT

## 2022-05-23 PROCEDURE — 84100 ASSAY OF PHOSPHORUS: CPT

## 2022-05-23 PROCEDURE — 83735 ASSAY OF MAGNESIUM: CPT

## 2022-05-23 PROCEDURE — 85576 BLOOD PLATELET AGGREGATION: CPT

## 2022-05-23 PROCEDURE — 96374 THER/PROPH/DIAG INJ IV PUSH: CPT

## 2022-05-23 PROCEDURE — 86803 HEPATITIS C AB TEST: CPT

## 2022-05-23 PROCEDURE — 93306 TTE W/DOPPLER COMPLETE: CPT

## 2022-05-23 PROCEDURE — 81001 URINALYSIS AUTO W/SCOPE: CPT

## 2022-05-23 PROCEDURE — 87635 SARS-COV-2 COVID-19 AMP PRB: CPT

## 2022-05-23 PROCEDURE — 85027 COMPLETE CBC AUTOMATED: CPT

## 2022-05-23 PROCEDURE — 85610 PROTHROMBIN TIME: CPT

## 2022-05-23 PROCEDURE — 93312 ECHO TRANSESOPHAGEAL: CPT

## 2022-05-23 PROCEDURE — 83036 HEMOGLOBIN GLYCOSYLATED A1C: CPT

## 2022-05-23 PROCEDURE — 71045 X-RAY EXAM CHEST 1 VIEW: CPT

## 2022-05-23 PROCEDURE — 99233 SBSQ HOSP IP/OBS HIGH 50: CPT

## 2022-05-23 PROCEDURE — 93312 ECHO TRANSESOPHAGEAL: CPT | Mod: 26

## 2022-05-23 PROCEDURE — 80048 BASIC METABOLIC PNL TOTAL CA: CPT

## 2022-05-23 PROCEDURE — 99232 SBSQ HOSP IP/OBS MODERATE 35: CPT

## 2022-05-23 PROCEDURE — 70547 MR ANGIOGRAPHY NECK W/O DYE: CPT

## 2022-05-23 PROCEDURE — 93970 EXTREMITY STUDY: CPT | Mod: 26

## 2022-05-23 PROCEDURE — 85025 COMPLETE CBC W/AUTO DIFF WBC: CPT

## 2022-05-23 PROCEDURE — G0378: CPT

## 2022-05-23 PROCEDURE — 99284 EMERGENCY DEPT VISIT MOD MDM: CPT

## 2022-05-23 PROCEDURE — 82962 GLUCOSE BLOOD TEST: CPT

## 2022-05-23 PROCEDURE — 93970 EXTREMITY STUDY: CPT

## 2022-05-23 PROCEDURE — 93306 TTE W/DOPPLER COMPLETE: CPT | Mod: 26

## 2022-05-23 PROCEDURE — 96375 TX/PRO/DX INJ NEW DRUG ADDON: CPT

## 2022-05-23 PROCEDURE — 36415 COLL VENOUS BLD VENIPUNCTURE: CPT

## 2022-05-23 PROCEDURE — 70450 CT HEAD/BRAIN W/O DYE: CPT

## 2022-05-23 PROCEDURE — 80061 LIPID PANEL: CPT

## 2022-05-23 PROCEDURE — 85730 THROMBOPLASTIN TIME PARTIAL: CPT

## 2022-05-23 PROCEDURE — 70544 MR ANGIOGRAPHY HEAD W/O DYE: CPT

## 2022-05-23 PROCEDURE — 84443 ASSAY THYROID STIM HORMONE: CPT

## 2022-05-23 RX ORDER — CLOPIDOGREL BISULFATE 75 MG/1
1 TABLET, FILM COATED ORAL
Qty: 30 | Refills: 0
Start: 2022-05-23 | End: 2022-06-21

## 2022-05-23 RX ORDER — CLOPIDOGREL BISULFATE 75 MG/1
75 TABLET, FILM COATED ORAL DAILY
Refills: 0 | Status: DISCONTINUED | OUTPATIENT
Start: 2022-05-23 | End: 2022-05-23

## 2022-05-23 RX ORDER — ASPIRIN/CALCIUM CARB/MAGNESIUM 324 MG
1 TABLET ORAL
Qty: 0 | Refills: 0 | DISCHARGE

## 2022-05-23 RX ORDER — ATORVASTATIN CALCIUM 80 MG/1
1 TABLET, FILM COATED ORAL
Qty: 30 | Refills: 0
Start: 2022-05-23 | End: 2022-06-21

## 2022-05-23 RX ORDER — ASPIRIN/CALCIUM CARB/MAGNESIUM 324 MG
81 TABLET ORAL DAILY
Refills: 0 | Status: DISCONTINUED | OUTPATIENT
Start: 2022-05-24 | End: 2022-05-23

## 2022-05-23 RX ORDER — ATORVASTATIN CALCIUM 80 MG/1
1 TABLET, FILM COATED ORAL
Qty: 0 | Refills: 0 | DISCHARGE

## 2022-05-23 RX ORDER — ACETAMINOPHEN 500 MG
650 TABLET ORAL EVERY 6 HOURS
Refills: 0 | Status: DISCONTINUED | OUTPATIENT
Start: 2022-05-23 | End: 2022-05-23

## 2022-05-23 RX ORDER — ASPIRIN/CALCIUM CARB/MAGNESIUM 324 MG
1 TABLET ORAL
Qty: 30 | Refills: 0
Start: 2022-05-23 | End: 2022-06-21

## 2022-05-23 RX ADMIN — CLOPIDOGREL BISULFATE 75 MILLIGRAM(S): 75 TABLET, FILM COATED ORAL at 18:41

## 2022-05-23 RX ADMIN — Medication 650 MILLIGRAM(S): at 04:59

## 2022-05-23 RX ADMIN — Medication 650 MILLIGRAM(S): at 06:07

## 2022-05-23 RX ADMIN — Medication 81 MILLIGRAM(S): at 13:16

## 2022-05-23 NOTE — PROGRESS NOTE ADULT - TIME BILLING
review of patient information including recent vital signs, labs, imaging, and notes; assessing, examining patient; updating patient/family; discussion and coordination of care with multidisciplinary team.
d/w Stroke ACP  will follow w/ you

## 2022-05-23 NOTE — PROGRESS NOTE ADULT - PROBLEM SELECTOR PLAN 1
MRI shows "acute/subacute infarction in the left posterior inferior cerebellar hemisphere with additional focal infarctions in the right cerebellar hemisphere;" cont. work-up and mgmt per Neuro; PT/OT; meclizine PRN dizziness; on  and statin for now

## 2022-05-23 NOTE — DISCHARGE NOTE PROVIDER - NSDCCPCAREPLAN_GEN_ALL_CORE_FT
PRINCIPAL DISCHARGE DIAGNOSIS  Diagnosis: CVA (cerebrovascular accident)  Assessment and Plan of Treatment: During this hospital admission, you had an ischemic stroke. During an ischemic stroke, blood stops flowing to part of your brain because of a blockage in the blood vessel. This can damage areas in the brain that control other parts of the body.  Doing your regular tasks may be difficult after you've had a stroke, but you can learn new ways to manage your daily activities. In fact, doing daily activities may help you to regain muscle strength. Be patient, give yourself time to adjust, and appreciate the progress you make. When showering or bathing, test the water temperature with a hand or foot that was not affected by the stroke. Use grab bars, a shower seat, a hand-held showerhead, and a long-handled brush. For dressing, dress while sitting, starting with the affected side or limb. Wear shirts that pull easily over your head and pants or skirts with elastic waistbands. Use zippers with loops attached to the pull tabs. You will learn additional new ways to manage after a stroke in rehabilitation. It is normal to feel fatigue after a stroke, while some days may be worse than others, you will continue to improve.  Call 911 right away if you have any of the following symptoms of another stroke:  B: Balance: Sudden: Dizziness, loss of balance, or a sense of falling, difficulty with coordinating movement  E: Eyes: Sudden double vision or trouble seeing in one or both eyes  F: Face: Sudden uneven face  A: Arms (Legs): Sudden weakness, tingling, or loss of feeling on one side of your face or body  S: Speech: Sudden trouble talking or slurred speech, sudden difficulty understanding others  T: Time: Please call 911 right away and go to the emergency room  •Sudden, severe headache  •Blackouts or seizures

## 2022-05-23 NOTE — DISCHARGE NOTE PROVIDER - NSDCMRMEDTOKEN_GEN_ALL_CORE_FT
amLODIPine 10 mg oral tablet: 1 tab(s) orally once a day  aspirin 81 mg oral tablet: 1 tab(s) orally once a day x 30 days   atorvastatin 40 mg oral tablet: 1 tab(s) orally once a day (at bedtime)  clopidogrel 75 mg oral tablet: 1 tab(s) orally once a day   Xanax 0.25 mg oral tablet: 1 tab(s) orally 2 times a day, As Needed

## 2022-05-23 NOTE — DISCHARGE NOTE NURSING/CASE MANAGEMENT/SOCIAL WORK - PATIENT PORTAL LINK FT
You can access the FollowMyHealth Patient Portal offered by [facility] by registering at the following website: [email or web address]. By joining [employer]’s FollowMyHealth portal, you will also be able to view your health information using other applications (apps) compatible with our system.

## 2022-05-23 NOTE — DISCHARGE NOTE PROVIDER - HOSPITAL COURSE
Hospital course:  77y Male with PMH     During this hospital course, patient had a (ischemic/hemorrhagic) stroke located in (left/right.....) as seen on (MRI/CT).   The stroke etiology is likely secondary to:  []atrial fibrillation  []small vessel disease from atherosclerotic risk factors  []other:  []etiology workup still in progress    Patient had the following workup done in house:  []head imaging:  []vessel imaging:  []echo  []labs  []other    Physical exam at discharge:     Neurologic:  -Mental status: Awake, alert, oriented to person, place, and time. Speech is fluent with intact naming, repetition, and comprehension, no dysarthria. Recent and remote memory intact. Follows commands. Attention/concentration intact. Fund of knowledge appropriate.  -Cranial nerves:   II: Visual fields are full to confrontation.  III, IV, VI: Extraocular movements are intact without nystagmus. Pupils equally round and reactive to light  V:  Facial sensation V1-V3 equal and intact   VII: Face is symmetric   Motor: Normal bulk and tone. No pronator drift. Strength bilateral upper extremity 5/5, bilateral lower extremities 5/5.  Sensation: Intact to light touch bilaterally. No neglect or extinction on double simultaneous testing.  Coordination: No dysmetria on finger-to-nose and heel-to-shin bilaterally  Reflexes: Downgoing toes bilaterally   Gait: Narrow gait and steady, occasionally leans to left  NIHSS:0    New medications on discharge: plavix 75mg daily, continue home aspirin 81mg and atorvastatin 40mg daily  Labs to be followed up:  Imaging to be done as outpatient:  Further outpatient workup:   Hospital course:  77y Male with PMHx of HTN, right midbrain stroke in 2015 (on aspirin and statin), presented to [facility] for room spinning dizziness, pt still dizzy after meclizine and ativan so transferred to [facility] for further evaluation. MRI was done which showed b/l cerebellar stroke. Patient also had echocardiogram done which showed pfo and non mobile plaque. Patient sees Dr. Denise Alegria as stroke neurologist from previous stroke. Dr. Brown spoke with Dr. Alegria who is also in agreement that patient should receive loop recorder outpatient which she will coordinate and LE dopplers due to pfo prior to discharge. Our recommendation was to monitor the patient on plavix and for swelling due to stroke, however, patient insisted on discharge, therefore, he was educated on warning signs/symptoms of worsening stroke/hemorrhage and has close followup with outpatient neurologist Dr. Alegria.     During this hospital course, patient had an ischemic stroke located in the bilateral cerebellum (left >right)  The stroke etiology is likely secondary to:  []atrial fibrillation  []small vessel disease from atherosclerotic risk factors  []other:  [x]etiology workup still in progress    Patient had the following workup done in house:  [x]head imaging::  CT Head No Cont (05.21.22 @ 03:51)   IMPRESSION:  No acute intracranial hemorrhage, mass effect, or recent transcortical   infarction.  [x]vessel imaging:    MR Angio Head No Cont (05.22.22 @ 09:48)   IMPRESSION:  Acute/subacute infarction in the left posterior inferior cerebellar   hemisphere with additional focal infarctions in the right cerebellar   hemisphere.. No large vessel occlusion.    MR Angio Head No Cont (05.22.22 @ 09:48) >  IMPRESSION: No large vessel occlusion. Mild stenosis of the proximal   right internal carotid artery per NASCET criteria.    [x]echo  NE w/Doppler (05.23.22 @ 12:02) >    CONCLUSIONS:     1. Normal left and right ventricular size and systolic function.   2. No LA/RA/PRIMO/RAA thrombus seen.   3. No significant valvular disease.   4. Color flow Doppler reveals evidence of an interatrial shunt with   right to left flow. Injection of agitated saline via a peripheral vein   reveals bubbles in the left heart within 3 heart beats, most consistent   with large a patent foramen ovale with a right to left shunt.   5. There is minimal non-mobile plaque seen in the visualized portion of   the descending aorta. There is mild non-mobile plaque seen in the   visualized portion of the aortic arch.    Echocardiogram w/ Bubble and Doppler (05.23.22 @ 14:59) >  CONCLUSIONS:   1. Normal left ventricular systolic function.   2. The inferolateral wall is not well visualized. Remaining segments   demonstrate normal wall motion.   3. Normal right ventricular size and systolic function.   4. Normal atria.   5. No significant valvular disease.   6. No evidence of pulmonary hypertension.   7. No pericardial effusion.   8. Compared to the previous TTE performed on 8/6/2015, there have been   no significant interval changes.   9. Consider repeat TTE with imaging solution such as Definity to better   enhance the endocardium and evaluate the left ventricular function, if   clinically indicated.    [x] labs  A1C with Estimated Average Glucose Result: 5.5  Thyroid Stimulating Hormone, Serum: 1.090 uIU/mL (05.22.22 @ 07:07)   LDL Cholesterol Calculated: 52 mg/dL (05.22.22 @ 07:07)    Physical exam at discharge:   Neurologic:  -Mental status: Awake, alert, oriented to person, place, and time. Speech is fluent with intact naming, repetition, and comprehension, no dysarthria. Recent and remote memory intact. Follows commands. Attention/concentration intact. Fund of knowledge appropriate.  -Cranial nerves:   II: Visual fields are full to confrontation.  III, IV, VI: Extraocular movements are intact without nystagmus. Pupils equally round and reactive to light  V:  Facial sensation V1-V3 equal and intact   VII: Face is symmetric   Motor: Normal bulk and tone. No pronator drift. Strength bilateral upper extremity 5/5, bilateral lower extremities 5/5.  Sensation: Intact to light touch bilaterally. No neglect or extinction on double simultaneous testing.  Coordination: No dysmetria on finger-to-nose and heel-to-shin bilaterally  Reflexes: Downgoing toes bilaterally   Gait: Narrow gait and steady, occasionally leans to left  NIHSS:0    New medications on discharge: plavix 75mg daily, continue home aspirin 81mg and atorvastatin 40mg daily  Further outpatient workup: loop recorder outpatient, follows with Dr. Denise Alegria (outpatient stroke neurologist) therefore follow up with her and she will coordinate outpatient loop    Hospital course:  77y Male with PMHx of HTN, right midbrain stroke in 2015 (on aspirin and statin), presented to [facility] for room spinning dizziness, pt still dizzy after meclizine and ativan so transferred to [facility] for further evaluation. MRI was done which showed b/l cerebellar stroke. Patient also had echocardiogram done which showed pfo and non mobile plaque. Patient sees Dr. Denise Alegria as stroke neurologist from previous stroke. Dr. Brown spoke with Dr. Alegria who is also in agreement that patient should receive loop recorder outpatient which she will coordinate and LE dopplers due to pfo prior to discharge. Le dopplers were negative for DVT in both extremities.  Our recommendation was to monitor the patient on plavix and for swelling due to stroke, however, patient insisted on discharge, therefore, he was educated on warning signs/symptoms of worsening stroke/hemorrhage and has close followup with outpatient neurologist Dr. Alegria.     During this hospital course, patient had an ischemic stroke located in the bilateral cerebellum (left >right)  The stroke etiology is likely secondary to:  []atrial fibrillation  []small vessel disease from atherosclerotic risk factors  []other:  [x]etiology workup still in progress    Patient had the following workup done in house:  [x]head imaging::  CT Head No Cont (05.21.22 @ 03:51)   IMPRESSION:  No acute intracranial hemorrhage, mass effect, or recent transcortical   infarction.  [x]vessel imaging:    MR Angio Head No Cont (05.22.22 @ 09:48)   IMPRESSION:  Acute/subacute infarction in the left posterior inferior cerebellar   hemisphere with additional focal infarctions in the right cerebellar   hemisphere.. No large vessel occlusion.    MR Angio Head No Cont (05.22.22 @ 09:48) >  IMPRESSION: No large vessel occlusion. Mild stenosis of the proximal   right internal carotid artery per NASCET criteria.    [x]echo  NE w/Doppler (05.23.22 @ 12:02) >    CONCLUSIONS:     1. Normal left and right ventricular size and systolic function.   2. No LA/RA/PRIMO/RAA thrombus seen.   3. No significant valvular disease.   4. Color flow Doppler reveals evidence of an interatrial shunt with   right to left flow. Injection of agitated saline via a peripheral vein   reveals bubbles in the left heart within 3 heart beats, most consistent   with large a patent foramen ovale with a right to left shunt.   5. There is minimal non-mobile plaque seen in the visualized portion of   the descending aorta. There is mild non-mobile plaque seen in the   visualized portion of the aortic arch.    Echocardiogram w/ Bubble and Doppler (05.23.22 @ 14:59) >  CONCLUSIONS:   1. Normal left ventricular systolic function.   2. The inferolateral wall is not well visualized. Remaining segments   demonstrate normal wall motion.   3. Normal right ventricular size and systolic function.   4. Normal atria.   5. No significant valvular disease.   6. No evidence of pulmonary hypertension.   7. No pericardial effusion.   8. Compared to the previous TTE performed on 8/6/2015, there have been   no significant interval changes.   9. Consider repeat TTE with imaging solution such as Definity to better   enhance the endocardium and evaluate the left ventricular function, if   clinically indicated.    US Duplex Venous Lower Ext Complete, Bilateral (05.23.22 @ 17:48) >  IMPRESSION:  No evidence of deep venous thrombosis in either lower extremity.    [x] labs  A1C with Estimated Average Glucose Result: 5.5  Thyroid Stimulating Hormone, Serum: 1.090 uIU/mL (05.22.22 @ 07:07)   LDL Cholesterol Calculated: 52 mg/dL (05.22.22 @ 07:07)    Physical exam at discharge:   Neurologic:  -Mental status: Awake, alert, oriented to person, place, and time. Speech is fluent with intact naming, repetition, and comprehension, no dysarthria. Recent and remote memory intact. Follows commands. Attention/concentration intact. Fund of knowledge appropriate.  -Cranial nerves:   II: Visual fields are full to confrontation.  III, IV, VI: Extraocular movements are intact without nystagmus. Pupils equally round and reactive to light  V:  Facial sensation V1-V3 equal and intact   VII: Face is symmetric   Motor: Normal bulk and tone. No pronator drift. Strength bilateral upper extremity 5/5, bilateral lower extremities 5/5.  Sensation: Intact to light touch bilaterally. No neglect or extinction on double simultaneous testing.  Coordination: No dysmetria on finger-to-nose and heel-to-shin bilaterally  Reflexes: Downgoing toes bilaterally   Gait: Narrow gait and steady, occasionally leans to left  NIHSS:0    New medications on discharge: plavix 75mg daily, continue home aspirin 81mg and atorvastatin 40mg daily  Further outpatient workup: loop recorder outpatient, follows with Dr. Denise Alegria (outpatient stroke neurologist) therefore follow up with her and she will coordinate outpatient loop

## 2022-05-23 NOTE — PROGRESS NOTE ADULT - SUBJECTIVE AND OBJECTIVE BOX
Patient was seen and examined at bedside. Case discuss with stroke PA. pt reports that his dizziness is improved from yesterday.     OBJECTIVE:  Vital Signs Last 24 Hrs  T(C): 37.1 (22 May 2022 10:00), Max: 37.3 (21 May 2022 16:59)  T(F): 98.8 (22 May 2022 10:00), Max: 99.2 (21 May 2022 16:59)  HR: 87 (22 May 2022 09:25) (75 - 92)  BP: 136/77 (22 May 2022 09:25) (108/64 - 144/78)  BP(mean): 99 (22 May 2022 09:25) (81 - 99)  RR: 16 (22 May 2022 09:25) (16 - 18)  SpO2: 97% (22 May 2022 09:25) (92% - 98%)      PHYSICAL EXAM:  Gen: Reclining in bed at time of exam, appears stated age  CV: RRR, +S1/S2  Pulm: adequate respiratory effort, no increase in work of breathing  Abd: soft, NT/ND +BS   Ext: strength 5/5 b/l     LABS:                        13.8   10.92 )-----------( 248      ( 22 May 2022 07:07 )             42.3     05-    141  |  105  |  15  ----------------------------<  111<H>  4.3   |  25  |  1.08    Ca    9.3      22 May 2022 07:07  Phos  2.5     -  Mg     1.9     -    TPro  7.6  /  Alb  4.6  /  TBili  0.4  /  DBili  x   /  AST  25  /  ALT  31  /  AlkPhos  82  05-21    LIVER FUNCTIONS - ( 21 May 2022 03:19 )  Alb: 4.6 g/dL / Pro: 7.6 g/dL / ALK PHOS: 82 U/L / ALT: 31 U/L / AST: 25 U/L / GGT: x           Urinalysis Basic - ( 21 May 2022 10:57 )    Color: Yellow / Appearance: Clear / S.015 / pH: x  Gluc: x / Ketone: NEGATIVE  / Bili: NEGATIVE / Urobili: 0.2 E.U./dL   Blood: x / Protein: Trace mg/dL / Nitrite: NEGATIVE   Leuk Esterase: NEGATIVE / RBC: < 5 /HPF / WBC < 5 /HPF   Sq Epi: x / Non Sq Epi: 0-5 /HPF / Bacteria: x    MRI Brain: L cerebellar stroke      A/P:77y Male with PMHx of HTN, right midbrain stroke in  (on aspirin and statin), presented to [facility] for room spinning dizziness found to have a CVA.    #CVA  #Hx of right midbrain CVA in 2015   #HTN  -Will check a NE  -Continue telemetry  -Will continue ASA and statin    -Fall precautions  -PT evaluation     #Leukocytosis  -Will f/u WBC (improved)  and CXR    #DISPO  -Will d/c once medically stable         
Neurology Stroke Progress Note    INTERVAL HPI/OVERNIGHT EVENTS:  Patient seen and examined. Pt impatient this morning and wants to get his MRI. Pt denies feeling dizzy or nauseous  MRI done this morning.     MEDICATIONS  (STANDING):  aspirin enteric coated 81 milliGRAM(s) Oral daily  atorvastatin 40 milliGRAM(s) Oral at bedtime  enoxaparin Injectable 40 milliGRAM(s) SubCutaneous every 24 hours    MEDICATIONS  (PRN):  benzocaine 15 mG/menthol 3.6 mG Lozenge 1 Lozenge Oral two times a day PRN Sore Throat  meclizine 25 milliGRAM(s) Oral three times a day PRN Dizziness      Allergies    penicillin (Unknown)    Intolerances        Vital Signs Last 24 Hrs  T(C): 37.3 (22 May 2022 06:20), Max: 37.3 (21 May 2022 16:59)  T(F): 99.1 (22 May 2022 06:20), Max: 99.2 (21 May 2022 16:59)  HR: 87 (22 May 2022 09:25) (75 - 92)  BP: 136/77 (22 May 2022 09:25) (108/64 - 145/82)  BP(mean): 99 (22 May 2022 09:25) (81 - 99)  RR: 16 (22 May 2022 09:25) (16 - 18)  SpO2: 97% (22 May 2022 09:25) (92% - 98%)    Physical exam:  General: No acute distress, awake and alert  Eyes: Anicteric sclerae, moist conjunctivae, see below for CNs  Neck: trachea midline,  Cardiovascular: Regular rate and rhythm, no murmurs  Pulmonary: Anterior breath sounds clear bilaterally No use of accessory muscles  GI: Abdomen soft, non-distended, non-tender  Extremities: no edema    Neurologic:  -Mental status: Awake, alert, oriented to person, place, and time. Speech is fluent with intact naming, repetition, and comprehension, no dysarthria. Recent and remote memory intact. Follows commands. Attention/concentration intact. Fund of knowledge appropriate.  -Cranial nerves:   II: Visual fields are full to confrontation.  III, IV, VI: Extraocular movements are intact without nystagmus. Pupils equally round and reactive to light  V:  Facial sensation V1-V3 equal and intact   VII: Face is symmetric   Motor: Normal bulk and tone. No pronator drift. Strength bilateral upper extremity 5/5, bilateral lower extremities 5/5.  Sensation: Intact to light touch bilaterally. No neglect or extinction on double simultaneous testing.  Coordination: No dysmetria on finger-to-nose and heel-to-shin bilaterally  Reflexes: Downgoing toes bilaterally   Gait: Narrow gait and steady, occasionally leans to left    LABS:                        13.8   10.92 )-----------( 248      ( 22 May 2022 07:07 )             42.3         141  |  105  |  15  ----------------------------<  111<H>  4.3   |  25  |  1.08    Ca    9.3      22 May 2022 07:07  Phos  2.5       Mg     1.9         TPro  7.6  /  Alb  4.6  /  TBili  0.4  /  DBili  x   /  AST  25  /  ALT  31  /  AlkPhos  82        Urinalysis Basic - ( 21 May 2022 10:57 )    Color: Yellow / Appearance: Clear / S.015 / pH: x  Gluc: x / Ketone: NEGATIVE  / Bili: NEGATIVE / Urobili: 0.2 E.U./dL   Blood: x / Protein: Trace mg/dL / Nitrite: NEGATIVE   Leuk Esterase: NEGATIVE / RBC: < 5 /HPF / WBC < 5 /HPF   Sq Epi: x / Non Sq Epi: 0-5 /HPF / Bacteria: x        RADIOLOGY & ADDITIONAL TESTS:    
  Patient is a 77y old  Male who presents with a chief complaint of     INTERVAL HPI/OVERNIGHT EVENTS:    Pt. seen and examined earlier today  Pt. feels well, denies dizziness, wants to go home  NE results d/w Pt.    Review of Systems: 12 point review of systems otherwise negative    MEDICATIONS  (STANDING):  atorvastatin 40 milliGRAM(s) Oral at bedtime  enoxaparin Injectable 40 milliGRAM(s) SubCutaneous every 24 hours    MEDICATIONS  (PRN):  acetaminophen     Tablet .. 650 milliGRAM(s) Oral every 6 hours PRN Mild Pain (1 - 3)  benzocaine 15 mG/menthol 3.6 mG Lozenge 1 Lozenge Oral two times a day PRN Sore Throat  meclizine 25 milliGRAM(s) Oral three times a day PRN Dizziness      Allergies    penicillin (Unknown)    Intolerances          Vital Signs Last 24 Hrs  T(C): 37.1 (23 May 2022 05:41), Max: 37.2 (22 May 2022 22:07)  T(F): 98.8 (23 May 2022 05:41), Max: 99 (22 May 2022 22:07)  HR: 88 (23 May 2022 12:42) (67 - 88)  BP: 132/85 (23 May 2022 12:42) (130/76 - 137/79)  BP(mean): 100 (23 May 2022 12:42) (96 - 101)  RR: 16 (23 May 2022 12:42) (16 - 18)  SpO2: 95% (23 May 2022 12:42) (93% - 96%)  CAPILLARY BLOOD GLUCOSE          05-22 @ 07:01  -  05-23 @ 07:00  --------------------------------------------------------  IN: 660 mL / OUT: 550 mL / NET: 110 mL        Physical Exam:  (earlier today)  Daily     Daily   General:  well-appearing in NAD  HEENT:  MMM  Neuro:  AAOx3, no dysarthria    LABS:                        13.9   9.18  )-----------( 225      ( 23 May 2022 07:50 )             40.7     05-23    140  |  106  |  15  ----------------------------<  113<H>  5.0   |  25  |  1.01    Ca    9.2      23 May 2022 07:50  Phos  2.4     05-23  Mg     1.9     05-23      PT/INR - ( 23 May 2022 09:06 )   PT: 11.5 sec;   INR: 0.97          PTT - ( 23 May 2022 09:06 )  PTT:30.3 sec

## 2022-05-23 NOTE — DISCHARGE NOTE NURSING/CASE MANAGEMENT/SOCIAL WORK - NSDCPEFALRISK_GEN_ALL_CORE
For information on Fall & Injury Prevention, visit: [email or web address] OR  [email or web address] OR  https://www.cdc.gov/steadi/patient.html

## 2022-05-27 DIAGNOSIS — I65.21 OCCLUSION AND STENOSIS OF RIGHT CAROTID ARTERY: ICD-10-CM

## 2022-05-27 DIAGNOSIS — I10 ESSENTIAL (PRIMARY) HYPERTENSION: ICD-10-CM

## 2022-05-27 DIAGNOSIS — E11.9 TYPE 2 DIABETES MELLITUS WITHOUT COMPLICATIONS: ICD-10-CM

## 2022-05-27 DIAGNOSIS — I63.9 CEREBRAL INFARCTION, UNSPECIFIED: ICD-10-CM

## 2022-05-27 DIAGNOSIS — Q21.1 ATRIAL SEPTAL DEFECT: ICD-10-CM

## 2022-05-27 DIAGNOSIS — R42 DIZZINESS AND GIDDINESS: ICD-10-CM

## 2022-05-27 DIAGNOSIS — E78.5 HYPERLIPIDEMIA, UNSPECIFIED: ICD-10-CM

## 2022-05-27 DIAGNOSIS — R29.700 NIHSS SCORE 0: ICD-10-CM

## 2022-05-27 DIAGNOSIS — D72.829 ELEVATED WHITE BLOOD CELL COUNT, UNSPECIFIED: ICD-10-CM

## 2025-02-28 NOTE — PATIENT PROFILE ADULT - NSPROGENSOURCEINFO_GEN_A_NUR
[facility] [place]  [address] [facility] [address]  Dept: [phone]     Subjective:  Mj Ferrera is a 73 y.o. male presenting today for routine follow up of  type 2 DM  hyperthyroidism , hypertension, and hyperlipidemia and recent labs.  He denies chest pain, shortness of breath or palpitations.  He reports headaches, no vision changes but new glaucoma. He denies myalgias.  He denies numbness and tingling in the hands and feet. He has been compliant with diet and exercise. He has been compliant with his medications.  He is tolerating medications.    Mometasone 2 mg capsule opens and mixes with 120 ml distilled water for nasal lavage once every 3-4 days did well   Singulair  daily prn     Do not use flonase while on this med.     Continues on protonix chronic GERD  Hx 2 sinus surgeries in past.   Pt c/o cough but with URI symptoms 2 weeks ago  Also on singulair     Hyperthyroid on tapazole    Htn on metoprolol denies dizziness.     Now taking steroid in distilled water up nose feels better but has congestion and cough   Questioning if cough from lisinopril doubtful since only at bedtime . If related to ACEI then should be all the time.     Hyperlipidemia on statin   Lab Results   Component Value Date     (H) 05/15/2024     Pt encoruaged to remember daily use of this med.     Pt with elevated blood sugar was tried on metformin prior buthad diarrhea.     Hemoglobin A1C   Date Value Ref Range Status   02/28/2025 7.2 % Final     Start januvia pt does not eat breakfast and sometimes skips lunch.   Pt will take januvia in am of the day it works for 24 hours.          Hypertension: well controlled with current medications   Medications: continue current medication doses     Lab Results   Component Value Date    CHOL 173 05/15/2024    CHOL 193 02/13/2023    CHOL 135 06/09/2022     Lab Results   Component Value Date    TRIG 112  patient